# Patient Record
Sex: FEMALE | HISPANIC OR LATINO | RURAL
[De-identification: names, ages, dates, MRNs, and addresses within clinical notes are randomized per-mention and may not be internally consistent; named-entity substitution may affect disease eponyms.]

---

## 2017-11-14 ENCOUNTER — OFFICE VISIT (OUTPATIENT)
Dept: RETAIL CLINIC | Facility: CLINIC | Age: 32
End: 2017-11-14

## 2017-11-14 VITALS
HEIGHT: 62 IN | OXYGEN SATURATION: 98 % | TEMPERATURE: 98.4 F | DIASTOLIC BLOOD PRESSURE: 72 MMHG | WEIGHT: 115.5 LBS | RESPIRATION RATE: 14 BRPM | HEART RATE: 96 BPM | SYSTOLIC BLOOD PRESSURE: 106 MMHG | BODY MASS INDEX: 21.25 KG/M2

## 2017-11-14 DIAGNOSIS — N30.00 ACUTE CYSTITIS WITHOUT HEMATURIA: Primary | ICD-10-CM

## 2017-11-14 LAB
BILIRUB BLD-MCNC: NEGATIVE MG/DL
CLARITY, POC: CLEAR
COLOR UR: YELLOW
GLUCOSE UR STRIP-MCNC: NEGATIVE MG/DL
KETONES UR QL: NEGATIVE
LEUKOCYTE EST, POC: ABNORMAL
NITRITE UR-MCNC: NEGATIVE MG/ML
PH UR: 7 [PH] (ref 5–8)
PROT UR STRIP-MCNC: NEGATIVE MG/DL
RBC # UR STRIP: NEGATIVE /UL
SP GR UR: 1.01 (ref 1–1.03)
UROBILINOGEN UR QL: NORMAL

## 2017-11-14 PROCEDURE — 81003 URINALYSIS AUTO W/O SCOPE: CPT | Performed by: NURSE PRACTITIONER

## 2017-11-14 PROCEDURE — 99213 OFFICE O/P EST LOW 20 MIN: CPT | Performed by: NURSE PRACTITIONER

## 2017-11-14 RX ORDER — AMOXICILLIN AND CLAVULANATE POTASSIUM 500; 125 MG/1; MG/1
1 TABLET, FILM COATED ORAL 2 TIMES DAILY
Qty: 14 TABLET | Refills: 0 | Status: SHIPPED | OUTPATIENT
Start: 2017-11-14 | End: 2018-03-30

## 2017-11-14 NOTE — PATIENT INSTRUCTIONS
Infección de las vías urinarias en los adultos  (Urinary Tract Infection, Adult)  Caio infección urinaria (IU) es caio infección en cualquier parte de las vías urinarias, que incluyen los riñones, los uréteres, la vejiga y la uretra. Estos órganos fabrican, almacenan y eliminan la orina del organismo. La IU puede ser caio infección de la vejiga (cistitis) o infección de los riñones (pielonefritis).  CAUSAS  Esta infección puede ser causada por hongos, virus o bacterias. Las bacterias son las causas más comunes de las IU. Esta afección también puede ser provocada por no vaciar la vejiga por completo ji la micción en repetidas ocasiones.   FACTORES DE RIESGO  Es más probable que esta afección se manifieste si:   · Usted ignora la necesidad de orinar o retiene la orina ji largos períodos.  · No vacía la vejiga completamente ji la micción.  · Se limpia de atrás hacia adelante después de orinar o defecar, en el emely de que sea josh.  · Está circuncidado, en el emely de que sea varón.    · Tiene estreñimiento.  · Tiene colocada caio sonda urinaria permanente.  · Tiene debilitado el sistema de defensa (inmunitario) del cuerpo.  · Tiene caio enfermedad que afecta los intestinos, los riñones o la vejiga.  · Tiene diabetes.  · Chiqui antibióticos con frecuencia o ji largos períodos, y los antibióticos ya no resultan eficaces para combatir algunos tipos de infecciones (resistencia a los antibióticos).  · Chiqui medicamentos que le irritan las vías urinarias.  · Está expuesto a sustancias químicas que le irritan las vías urinarias.  · Es josh.  SÍNTOMAS   Los síntomas de esta afección incluyen lo siguiente:   · Fiebre.    · Micción frecuente o eliminación de pequeñas cantidades de orina con frecuencia.    · Necesidad urgente de orinar.    · Ardor o dolor al orinar.    · Orina con mal olor u olor atípico.    · Orina turbia.    · Dolor en la parte baja del abdomen o en la espalda.    · Dificultad para orinar.     · Mark en la orina.    · Vómitos o más apetito de lo normal.     · Diarrea o dolor abdominal.    · Secreción vaginal, si es josh.  DIAGNÓSTICO  Esta afección se diagnostica mediante maría antecedentes médicos y un examen físico. También deberá proporcionar caio muestra de orina para realizar análisis. Podrán indicarle otros estudios, por ejemplo:    · Análisis de mark.    · Pruebas de detección de enfermedades de transmisión sexual (ETS).     Si ha tenido más de caio IU, se pueden hacer estudios de diagnóstico por imágenes o caio citoscopia para determinar la causa de las infecciones.   TRATAMIENTO   El tratamiento de esta afección suele incluir caio combinación de dos o más de los siguientes:   · Antibióticos.    · Otros medicamentos para tratar las causas menos frecuentes de infección urinaria.    · Medicamentos de venta tamir para aliviar el dolor.    · Consumo de la cantidad necesaria de agua para mantenerse hidratado.  INSTRUCCIONES PARA EL CUIDADO EN EL HOGAR  · [place] los medicamentos de venta tamir y los recetados solamente betsy se lo haya indicado el médico.  · Si le recetaron un antibiótico, tómelo betsy se lo haya indicado el médico. No deje de anna los antibióticos aunque comience a sentirse mejor.  · Evite el alcohol, la cafeína, el té y las bebidas gaseosas. Estas sustancias pueden irritar la vejiga.  · Ana suficiente líquido para mantener la orina susie o de color amarillo pálido.  · Concurra a todas las visitas de control betsy se lo haya indicado el médico. [place] es importante.  · Asegúrese de lo siguiente:    Vaciar la vejiga con frecuencia y en huitron totalidad. No contener la orina ji largos períodos.    Vaciar la vejiga antes y después de tener relaciones sexuales.    Limpiar de adelante hacia atrás después de defecar, si es josh. Usar cada trozo de papel caio vez cuando se limpie.  SOLICITE ATENCIÓN MÉDICA SI:   · Siente dolor en la espalda.    · Tiene fiebre.  · Siente náuseas o  vomita.    · Los síntomas no mejoran después de 3 días de tratamiento.     · Los síntomas desaparecen y luego reaparecen.  SOLICITE ATENCIÓN MÉDICA DE INMEDIATO SI:   · Siente dolor intenso en la espalda o en la taj inferior del abdomen.    · Tiene vómitos y no puede tragar medicamentos ni agua.     Esta información no tiene betsy fin reemplazar el consejo del médico. Asegúrese de hacerle al médico cualquier pregunta que tenga.     Document Released: 09/27/2006 Document Revised: 04/10/2017  Elsevier Interactive Patient Education ©2017 [employer] Inc.

## 2017-11-14 NOTE — PROGRESS NOTES
"Subjective   Trisha Kearney is a 31 y.o. female.   /72 (BP Location: Left arm, Patient Position: Sitting, Cuff Size: Adult)  Pulse 96  Temp 98.4 °F (36.9 °C) (Temporal Artery )   Resp 14  Ht 62\" (157.5 cm)  Wt 115 lb 8 oz (52.4 kg)  LMP 11/04/2017  SpO2 98%  BMI 21.13 kg/m2      Urinary Tract Infection    This is a new problem. The current episode started in the past 7 days. The problem has been gradually worsening. The quality of the pain is described as burning. The pain is mild. Associated symptoms include frequency and urgency. Pertinent negatives include no discharge, hematuria, hesitancy, nausea, possible pregnancy, sweats or vomiting. Flank pain: left side.        The following portions of the patient's history were reviewed and updated as appropriate: allergies, current medications, past family history, past medical history, past social history, past surgical history and problem list.    Review of Systems   Gastrointestinal: Negative for nausea and vomiting.   Genitourinary: Positive for frequency and urgency. Negative for hematuria and hesitancy. Flank pain: left side.       Objective   Physical Exam   Constitutional: She appears well-developed and well-nourished.  Non-toxic appearance. She does not have a sickly appearance.   HENT:   Head: Normocephalic and atraumatic.   Right Ear: Tympanic membrane and ear canal normal.   Left Ear: Tympanic membrane and ear canal normal.   Nose: Mucosal edema and rhinorrhea present. Right sinus exhibits no maxillary sinus tenderness and no frontal sinus tenderness. Left sinus exhibits no maxillary sinus tenderness and no frontal sinus tenderness.   Cardiovascular: Regular rhythm and normal heart sounds.    Pulmonary/Chest: Effort normal. She has no wheezes. She has no rhonchi. She has no rales.   Abdominal: Soft. Bowel sounds are normal. There is tenderness in the periumbilical area. There is no rigidity, no rebound, no guarding and no CVA tenderness. "   Lymphadenopathy:     She has no cervical adenopathy.   Skin: Skin is warm and dry.       Assessment/Plan   Trisha was seen today for urinary tract infection.    Diagnoses and all orders for this visit:    Acute cystitis without hematuria  -     POC Urinalysis Dipstick, Automated    Other orders  -     amoxicillin-clavulanate (AUGMENTIN) 500-125 MG per tablet; Take 1 tablet by mouth 2 (Two) Times a Day.      Results for orders placed or performed in visit on 11/14/17   POC Urinalysis Dipstick, Automated   Result Value Ref Range    Color Yellow Yellow, Straw, Dark Yellow, Stephanie    Clarity, UA Clear Clear    Glucose, UA Negative Negative, 1000 mg/dL (3+) mg/dL    Bilirubin Negative Negative    Ketones, UA Negative Negative    Specific Gravity  1.010 1.005 - 1.030    Blood, UA Negative Negative    pH, Urine 7.0 5.0 - 8.0    Protein, POC Negative Negative mg/dL    Urobilinogen, UA Normal Normal    Leukocytes Trace (A) Negative    Nitrite, UA Negative Negative

## 2017-12-11 ENCOUNTER — OFFICE VISIT (OUTPATIENT)
Dept: RETAIL CLINIC | Facility: CLINIC | Age: 32
End: 2017-12-11

## 2017-12-11 VITALS — HEIGHT: 59 IN | WEIGHT: 119 LBS | BODY MASS INDEX: 23.99 KG/M2

## 2017-12-11 DIAGNOSIS — H61.23 BILATERAL IMPACTED CERUMEN: Primary | ICD-10-CM

## 2017-12-11 PROCEDURE — 99213 OFFICE O/P EST LOW 20 MIN: CPT | Performed by: NURSE PRACTITIONER

## 2017-12-11 NOTE — PATIENT INSTRUCTIONS
Earwax Buildup  Your ears make a substance called earwax. It may also be called cerumen. Sometimes, too much earwax builds up in your ear canal. This can cause ear pain and make it harder for you to hear.  CAUSES  This condition is caused by too much earwax production or buildup.  RISK FACTORS  The following factors may make you more likely to develop this condition:  · Cleaning your ears often with swabs.  · Having narrow ear canals.  · Having earwax that is overly thick or sticky.  · Having eczema.  · Being dehydrated.  SYMPTOMS  Symptoms of this condition include:  · Reduced hearing.  · Ear drainage.  · Ear pain.  · Ear itch.  · A feeling of fullness in the ear or feeling that the ear is plugged.  · Ringing in the ear.  · Coughing.  DIAGNOSIS  Your health care provider can diagnose this condition based on your symptoms and medical history. Your health care provider will also do an ear exam to look inside your ear with a scope (otoscope). You may also have a hearing test.  TREATMENT  Treatment for this condition includes:  · Over-the-counter or prescription ear drops to soften the earwax.  · Earwax removal by a health care provider. This may be done:    By flushing the ear with body-temperature water.    With a medical instrument that has a loop at the end (earwax curette).    With a suction device.  HOME CARE INSTRUCTIONS  · Take over-the-counter and prescription medicines only as told by your health care provider.  · Do not put any objects, including an ear swab, into your ear. You can clean the opening of your ear canal with a washcloth.  · Drink enough water to keep your urine clear or pale yellow.  · If you have frequent earwax buildup or you use hearing aids, consider seeing your health care provider every 6-12 months for routine preventive ear cleanings. Keep all follow-up visits as told by your health care provider.  SEEK MEDICAL CARE IF:  · You have ear pain.  · Your condition does not improve with  treatment.  · You have hearing loss.  · You have blood, pus, or other fluid coming from your ear.     This information is not intended to replace advice given to you by your health care provider. Make sure you discuss any questions you have with your health care provider.     Document Released: 01/25/2006 Document Revised: 04/10/2017 Document Reviewed: 08/03/2016  [employer] Interactive Patient Education ©2017 Elsevier Inc.

## 2017-12-11 NOTE — PROGRESS NOTES
"Subjective   Trisha Kearney is a 32 y.o. female.     Ear Fullness    There is pain in both ears. This is a chronic problem. The current episode started more than 1 month ago. The problem occurs constantly. The problem has been unchanged. There has been no fever. The patient is experiencing no pain. Associated symptoms comments: Wax build up. She has tried ear drops for the symptoms. The treatment provided no relief. There is no history of a chronic ear infection, hearing loss or a tympanostomy tube.        The following portions of the patient's history were reviewed and updated as appropriate: allergies, current medications, past medical history, past social history, past surgical history and problem list.    Review of Systems   Constitutional: Negative.  Negative for fever.   HENT: Negative.         Ear fullness, was build up bilat   Respiratory: Negative.    Hematological: Negative.  Negative for adenopathy.        Ht 148.6 cm (58.5\")  Wt 54 kg (119 lb)  LMP 12/06/2017  BMI 24.45 kg/m2     Objective   Physical Exam   Constitutional: She is oriented to person, place, and time. She appears well-developed and well-nourished. No distress.   HENT:   Head: Normocephalic.   Right Ear: Hearing and external ear normal. No drainage, swelling or tenderness. A foreign body (cerumen impaction) is present. Right ear bulging TM: unable to view TM.   Left Ear: Hearing and external ear normal. No drainage, swelling or tenderness. A foreign body (cerumen impaction) is present. Left ear bulging TM: unable to view TM.   Neurological: She is alert and oriented to person, place, and time.   Skin: Skin is warm and dry.   Psychiatric: She has a normal mood and affect. Her behavior is normal. Thought content normal.   Vitals reviewed.      Assessment/Plan   Diagnoses and all orders for this visit:    Bilateral impacted cerumen    Treated with O2H2 and water.  Unable to clear impaction.  Patient tolerated procedure well.           "

## 2018-03-30 ENCOUNTER — OFFICE VISIT (OUTPATIENT)
Dept: RETAIL CLINIC | Facility: CLINIC | Age: 33
End: 2018-03-30

## 2018-03-30 VITALS
OXYGEN SATURATION: 98 % | HEIGHT: 59 IN | TEMPERATURE: 98.1 F | RESPIRATION RATE: 24 BRPM | HEART RATE: 88 BPM | BODY MASS INDEX: 23.71 KG/M2 | WEIGHT: 117.6 LBS

## 2018-03-30 DIAGNOSIS — N30.01 ACUTE CYSTITIS WITH HEMATURIA: Primary | ICD-10-CM

## 2018-03-30 LAB
BILIRUB BLD-MCNC: NEGATIVE MG/DL
CLARITY, POC: ABNORMAL
COLOR UR: YELLOW
GLUCOSE UR STRIP-MCNC: NEGATIVE MG/DL
KETONES UR QL: NEGATIVE
LEUKOCYTE EST, POC: ABNORMAL
NITRITE UR-MCNC: POSITIVE MG/ML
PH UR: 5.5 [PH] (ref 5–8)
PROT UR STRIP-MCNC: ABNORMAL MG/DL
RBC # UR STRIP: ABNORMAL /UL
SP GR UR: 1.01 (ref 1–1.03)
UROBILINOGEN UR QL: NORMAL

## 2018-03-30 PROCEDURE — 99213 OFFICE O/P EST LOW 20 MIN: CPT | Performed by: NURSE PRACTITIONER

## 2018-03-30 PROCEDURE — 81002 URINALYSIS NONAUTO W/O SCOPE: CPT | Performed by: NURSE PRACTITIONER

## 2018-03-30 RX ORDER — NITROFURANTOIN 25; 75 MG/1; MG/1
100 CAPSULE ORAL EVERY 12 HOURS SCHEDULED
Qty: 14 CAPSULE | Refills: 0 | OUTPATIENT
Start: 2018-03-30 | End: 2018-04-06

## 2018-03-30 RX ORDER — PHENAZOPYRIDINE HYDROCHLORIDE 200 MG/1
200 TABLET, FILM COATED ORAL 3 TIMES DAILY PRN
Qty: 6 TABLET | Refills: 0 | OUTPATIENT
Start: 2018-03-30 | End: 2018-04-01

## 2018-03-30 NOTE — PROGRESS NOTES
Subjective   Trisha Kearney is a 32 y.o. female.   Chief Complaint   Patient presents with   • Urinary Frequency      33 yo female, accompanied by  and children, ( ,  pt not fluent in english), presents with complaint of burning with urination, urinary frequency, and back pain duration of 3 days.  She has taken no medication.  See HPI/ROS for additonal information.      Urinary Frequency    This is a new problem. The current episode started in the past 7 days (3 days ago). The problem occurs every urination. The problem has been gradually worsening. The quality of the pain is described as burning. The pain is at a severity of 7/10. There has been no fever. She is sexually active. There is no history of pyelonephritis. Associated symptoms include flank pain, frequency, hematuria and urgency. Pertinent negatives include no chills. She has tried nothing for the symptoms. The treatment provided no relief.        The following portions of the patient's history were reviewed and updated as appropriate: allergies, current medications, past family history, past medical history, past social history, past surgical history and problem list.    Current Outpatient Prescriptions:   •  nitrofurantoin, macrocrystal-monohydrate, (MACROBID) 100 MG capsule, Take 1 capsule by mouth Every 12 (Twelve) Hours for 7 days., Disp: 14 capsule, Rfl: 0  •  phenazopyridine (PYRIDIUM) 200 MG tablet, Take 1 tablet by mouth 3 (Three) Times a Day As Needed for bladder spasms for up to 2 days., Disp: 6 tablet, Rfl: 0    Review of Systems   Constitutional: Positive for fatigue. Negative for activity change, appetite change, chills and fever.   HENT: Negative.    Eyes: Negative.    Respiratory: Negative.    Cardiovascular: Negative.    Gastrointestinal: Negative.    Genitourinary: Positive for flank pain, frequency, hematuria and urgency. Negative for difficulty urinating, menstrual problem and pelvic pain.   Skin: Negative  "for rash.   All other systems reviewed and are negative.    Pulse 88   Temp 98.1 °F (36.7 °C) (Oral)   Resp 24   Ht 149.9 cm (59\")   Wt 53.3 kg (117 lb 9.6 oz)   LMP 03/05/2018 (Exact Date)   SpO2 98%   BMI 23.75 kg/m²     Objective   No Known Allergies    Physical Exam   Constitutional: She is oriented to person, place, and time. She appears well-developed and well-nourished. No distress.   HENT:   Head: Normocephalic.   Right Ear: External ear normal.   Left Ear: External ear normal.   Nose: Nose normal.   Mouth/Throat: Oropharynx is clear and moist. No oropharyngeal exudate.   Cardiovascular: Normal rate, regular rhythm and normal heart sounds.    Pulmonary/Chest: Effort normal and breath sounds normal. No respiratory distress.   Abdominal: Soft. Bowel sounds are normal. She exhibits no distension and no mass. There is tenderness in the suprapubic area. There is CVA tenderness. There is no rebound and no guarding. No hernia.   Neurological: She is alert and oriented to person, place, and time.   Skin: Skin is warm. Capillary refill takes less than 2 seconds. She is not diaphoretic.   Psychiatric: She has a normal mood and affect. Her behavior is normal.   Vitals reviewed.      Assessment/Plan   Trisha was seen today for urinary frequency.    Diagnoses and all orders for this visit:    Acute cystitis with hematuria  -     POC Urinalysis Dipstick    Other orders  -     nitrofurantoin, macrocrystal-monohydrate, (MACROBID) 100 MG capsule; Take 1 capsule by mouth Every 12 (Twelve) Hours for 7 days.  -     phenazopyridine (PYRIDIUM) 200 MG tablet; Take 1 tablet by mouth 3 (Three) Times a Day As Needed for bladder spasms for up to 2 days.        Results for orders placed or performed in visit on 03/30/18   POC Urinalysis Dipstick   Result Value Ref Range    Color Yellow Yellow, Straw, Dark Yellow, Stephanie    Clarity, UA Slightly Cloudy (A) Clear    Glucose, UA Negative Negative, 1000 mg/dL (3+) mg/dL    Bilirubin " Negative Negative    Ketones, UA Negative Negative    Specific Gravity  1.015 1.005 - 1.030    Blood, UA Trace (A) Negative    pH, Urine 5.5 5.0 - 8.0    Protein, POC Trace (A) Negative mg/dL    Urobilinogen, UA Normal Normal    Leukocytes Small (1+) (A) Negative    Nitrite, UA Positive (A) Negative        Discussed symptoms/urinary results. Pt/ decline urine sent for C&[facility] as they are self pay.  Discussed s/s of kidney infection.  Understanding verbalized and agree with plan of care.       An After Visit Summary was printed, reviewed, and given to the patient. Understanding verbalized and agrees with treatment plan.  If no improvement or becomes worse, follow up with primary or go to [facility].     March 30, 2018 9:48 AM

## 2018-03-30 NOTE — PATIENT INSTRUCTIONS
Polaquiuria en los adultos  (Urinary Frequency, Adult)  El término polaquiuria describe la necesidad de orinar con mayor frecuencia que lo normal. Las personas que padecen polaquiuria orinan, betsy mínimo, 8 veces en 24 horas, incluso cuando beben caio cantidad normal de líquido. Si delmer orinan con mayor frecuencia que lo habitual, la cantidad de orina total producida en un día podría ser normal. La polaquiuria también se conoce betsy micción frecuente.  CAUSAS  Esta afección puede ser causada por lo siguiente:  · Infección de las vías urinarias.  · Obesidad.  · Problemas de vejiga, betsy cálculos en la vejiga.  · Cafeína y alcohol.  · [place] alimentos y beber líquidos que irritan la vejiga. Por ejemplo, café, té, gaseosas, endulzantes artificiales, cítricos, alimentos preparados a base de tomate y chocolate.  · Determinados medicamentos, betsy los medicamentos que ayudan a que el cuerpo elimine el líquido extra (diuréticos).  · Debilidad muscular o nerviosa.  · Vejiga hiperactiva.  · Diabetes crónica.  · Cistitis intersticial.  · En los hombres, problemas en la próstata, betsy agrandamiento de la próstata.  · En las mujeres, el embarazo.  En algunos casos, es posible que la causa no se conozca.  FACTORES DE RIESGO  Es más probable que esta afección se manifieste en:  · Las mujeres que están cursando la menopausia.  · Los hombres con problemas de próstata.  · Las personas que tienen caio enfermedad o lesión que afecta los nervios de la médula omallye.  · Las personas que tienen o villalba tenido caio afección que afecta el cerebro, betsy ictus.  SÍNTOMAS  Los síntomas de esta afección incluyen lo siguiente:  · Sentir con frecuencia caio necesidad urgente de orinar. El estrés y la ansiedad por la necesidad de encontrar un baño rápidamente pueden hacer que esta urgencia empeore.  · Orinar 8 o más veces en 24 horas.  · Orinar cada 1 o 2 horas.  DIAGNÓSTICO  Esta afección se diagnostica en función de los síntomas, los antecedentes  médicos y un examen físico. Pueden hacerle estudios, por ejemplo:  · Análisis de mark.  · Análisis de orina.  · Estudios de diagnóstico por imágenes, betsy radiografías o ecografías.  · Estudio de vejiga.  · Estudio del sistema neurológico. [place] es el sistema corporal que detecta la necesidad de orinar.  · [place] prueba para detectar problemas en la uretra y la vejiga llamada cistoscopia.  También se le podría pedir que lleve un diario de micciones. Un diario de micciones es un registro de lo que come y lito, la frecuencia con la que orina y la cantidad. Es posible que tenga que visitar a un médico que se especialice en afecciones de las vías urinarias (urólogo) o de los riñones (nefrólogo).  TRATAMIENTO  El tratamiento de esta afección depende de la causa. A veces, la afección desaparece por sí bayron y el tratamiento no es necesario. En emely de ser necesario, el tratamiento podría consistir en lo siguiente:  · Vito medicamentos.  · Ejercicios de aprendizaje que fortalecen los músculos que ayudan a controlar la micción.  · Seguir un programa de educación del esfínter vesical. [place] puede incluir lo siguiente:  ¨ Aprender a retrasar las ganas de ir al baño.  ¨ Orinar dos veces seguidas (vaciamiento de la vejiga). Esta técnica es útil cuando no vacía la vejiga de forma completa.  ¨ Micción programada.  · Realizar cambios en la dieta, tales betsy:  ¨ Evitar la cafeína.  ¨ Beber jennifer cantidad de líquidos, especialmente alcohol.  ¨ No beber por la noche.  ¨ No comer alimentos ni beber líquidos que puedan irritar la vejiga.  ¨ [place] alimentos que ayudan a prevenir o aliviar el estreñimiento. El estreñimiento puede empeorar esta afección.  · Tener los nervios de la vejiga estimulados. Hay dos opciones para estimular los nervios de la vejiga:  ¨ Estimulación eléctrica de los nervios de forma ambulatoria. [place] lo realiza el médico.  ¨ [place] para colocar un marcapasos para la vejiga. El marcapasos ayuda a controlar la  necesidad imperiosa de orinar.  INSTRUCCIONES PARA EL CUIDADO EN EL HOGAR  · Lleve un diario de micciones si se lo indicó el médico.  · [place] los medicamentos de venta tamir y los recetados solamente betsy se lo haya indicado el médico.  · [place] ejercicios betsy se lo haya indicado el médico.  · Siga un programa de educación del esfínter vesical demi betsy se lo haya indicado el médico.  · Realice los cambios en la dieta que jeannie necesarios.  · Concurra a todas las visitas de control betsy se lo haya indicado el médico. [place] es importante.  SOLICITE ATENCIÓN MÉDICA SI:  · Comienza a orinar con mayor frecuencia.  · Siente dolor o irritación al orinar.  · Observa mark en la orina.  · La orina se ve turbia.  · Tiene fiebre.  · Comienza a vomitar.  SOLICITE ATENCIÓN MÉDICA DE INMEDIATO SI:  · No puede orinar.  Esta información no tiene betsy fin reemplazar el consejo del médico. Asegúrese de hacerle al médico cualquier pregunta que tenga.  Document Released: 09/11/2013 Document Revised: 07/13/2016 Document Reviewed: 07/13/2016  [place] Interactive Patient Education © 2017 [employer] Inc.

## 2018-09-13 ENCOUNTER — OFFICE VISIT (OUTPATIENT)
Dept: RETAIL CLINIC | Facility: CLINIC | Age: 33
End: 2018-09-13

## 2018-09-13 VITALS
WEIGHT: 120 LBS | SYSTOLIC BLOOD PRESSURE: 122 MMHG | BODY MASS INDEX: 24.19 KG/M2 | TEMPERATURE: 97.9 F | HEIGHT: 59 IN | HEART RATE: 81 BPM | DIASTOLIC BLOOD PRESSURE: 70 MMHG | RESPIRATION RATE: 20 BRPM | OXYGEN SATURATION: 99 %

## 2018-09-13 DIAGNOSIS — H61.23 BILATERAL IMPACTED CERUMEN: ICD-10-CM

## 2018-09-13 DIAGNOSIS — N30.00 ACUTE CYSTITIS WITHOUT HEMATURIA: ICD-10-CM

## 2018-09-13 DIAGNOSIS — R30.0 DYSURIA: Primary | ICD-10-CM

## 2018-09-13 LAB
BILIRUB BLD-MCNC: NEGATIVE MG/DL
CLARITY, POC: ABNORMAL
COLOR UR: YELLOW
GLUCOSE UR STRIP-MCNC: NEGATIVE MG/DL
KETONES UR QL: NEGATIVE
LEUKOCYTE EST, POC: ABNORMAL
NITRITE UR-MCNC: NEGATIVE MG/ML
PH UR: 6 [PH] (ref 5–8)
PROT UR STRIP-MCNC: NEGATIVE MG/DL
RBC # UR STRIP: NEGATIVE /UL
SP GR UR: 1.02 (ref 1–1.03)
UROBILINOGEN UR QL: NORMAL

## 2018-09-13 PROCEDURE — 81003 URINALYSIS AUTO W/O SCOPE: CPT | Performed by: NURSE PRACTITIONER

## 2018-09-13 PROCEDURE — 99213 OFFICE O/P EST LOW 20 MIN: CPT | Performed by: NURSE PRACTITIONER

## 2018-09-13 RX ORDER — SULFAMETHOXAZOLE AND TRIMETHOPRIM 800; 160 MG/1; MG/1
1 TABLET ORAL 2 TIMES DAILY
Qty: 14 TABLET | Refills: 0 | Status: SHIPPED | OUTPATIENT
Start: 2018-09-13 | End: 2018-09-20

## 2018-09-13 RX ORDER — PHENAZOPYRIDINE HYDROCHLORIDE 100 MG/1
100 TABLET, FILM COATED ORAL 3 TIMES DAILY PRN
Qty: 6 TABLET | Refills: 0 | Status: SHIPPED | OUTPATIENT
Start: 2018-09-13 | End: 2018-09-15

## 2018-09-13 NOTE — PROGRESS NOTES
RUTH@  Trisha Kearney is a 32 y.o. female presents with spouse for urinary frequency and burning.  States that symptoms feel the same as last UTI.  Spouse states that previous medication (macrobid) did not help and she had to go get a different medication.  Denies any treatment for current symptoms.  Patient speaks limited English and spouse is interpreting.  Declined interpretor for visit.  Spouse also request to look at ears, states has problem with ear wax and has been using home irrigation system, wants to know if there is something else to try. Denies ear pain    Chief Complaint   Patient presents with   • Urinary Tract Infection      Urinary Tract Infection    This is a new problem. The current episode started in the past 7 days. The problem has been gradually worsening. The quality of the pain is described as burning and aching. The pain is at a severity of 7/10. The pain is moderate. There has been no fever. She is sexually active. There is no history of pyelonephritis. Associated symptoms include frequency and urgency. Pertinent negatives include no discharge, flank pain, hematuria, nausea, possible pregnancy or vomiting. She has tried nothing for the symptoms.        The following portions of the patient's history were reviewed and updated as appropriate: allergies, current medications, past family history, past medical history, past social history, past surgical history and problem list.  No current outpatient prescriptions on file.    No Known Allergies    Review of Systems   Constitutional: Negative for fever and unexpected weight change.   HENT: Negative for congestion, ear discharge, ear pain (states has ear wax in both ears), postnasal drip, rhinorrhea, sinus pain, sinus pressure, trouble swallowing and voice change.    Eyes: Negative.    Respiratory: Negative.    Cardiovascular: Negative.    Gastrointestinal: Negative.  Negative for nausea and vomiting.   Genitourinary: Positive for  dysuria, frequency, pelvic pain and urgency. Negative for flank pain, hematuria, vaginal bleeding, vaginal discharge and vaginal pain.   Musculoskeletal: Negative.    Skin: Negative.    Neurological: Negative.        Objective   Vitals:    09/13/18 0915   BP: 122/70   Pulse: 81   Resp: 20   Temp: 97.9 °F (36.6 °C)   SpO2: 99%         Physical Exam   Constitutional: She is oriented to person, place, and time. She appears well-developed and well-nourished. No distress.   HENT:   Head: Normocephalic and atraumatic.   Right Ear: A foreign body (bilateral cerumen impaction, unable to see TMs ) is present.   Left Ear: A foreign body is present.   Eyes: Pupils are equal, round, and reactive to light. Conjunctivae and lids are normal.   Neck: Normal range of motion.   Cardiovascular: Normal rate, regular rhythm and normal heart sounds.    No murmur heard.  Pulmonary/Chest: Effort normal and breath sounds normal. No respiratory distress. She has no wheezes.   Abdominal: Soft. Bowel sounds are normal. She exhibits no distension and no mass. There is tenderness in the suprapubic area. There is no guarding and no CVA tenderness.   Lymphadenopathy:     She has no cervical adenopathy.   Neurological: She is alert and oriented to person, place, and time.   Skin: Skin is warm and dry. No rash noted.   Psychiatric: She has a normal mood and affect. Her speech is normal and behavior is normal.   Patient speaks limited English, spouse translating for her.  Declined interpretor.       Lab Results (last 24 hours)     Procedure Component Value Units Date/Time    POC Urinalysis Dipstick, Automated [[id number]]  (Abnormal) Collected:  09/13/18 0908    Specimen:  Urine Updated:  09/13/18 0910     Color Yellow     Clarity, UA Cloudy (A)     Specific Gravity  1.025     pH, Urine 6.0     Leukocytes Small (1+) (A)     Nitrite, UA Negative     Protein, POC Negative mg/dL      Glucose, UA Negative mg/dL      Ketones, UA Negative     Urobilinogen,  UA Normal     Bilirubin Negative     Blood, UA Negative          Assessment/Plan     Trisha was seen today for urinary tract infection.    Diagnoses and all orders for this visit:    Bilateral impacted cerumen- Discussed use of Debrox ear drops over the counter and ear wax removal options with spouse-states will try ear drops and follow up if needed.    Dysuria  -     POC Urinalysis Dipstick, Automated    Acute cystitis without hematuria  -     sulfamethoxazole-trimethoprim (BACTRIM DS) 800-160 MG per tablet; Take 1 tablet by mouth 2 (Two) Times a Day for 7 days.  -     phenazopyridine (PYRIDIUM) 100 MG tablet; Take 1 tablet by mouth 3 (Three) Times a Day As Needed for bladder spasms for up to 2 days.      Urine results discussed with spouse and patient.  Spouse declined culture.    Medications and plan of care reviewed with patient and spouse and teaching done on med reactions/side effects(printed in English and Kiswahili).  Rest, plenty of fluids, comfort measures and follow up with PCP if symptoms persist.  If worse go to urgent care or ER as discussed.  Spouse and patient verbalized understanding.    HAVEN Martines

## 2018-09-13 NOTE — PATIENT INSTRUCTIONS
Urinary Tract Infection, Adult  A urinary tract infection (UTI) is an infection of any part of the urinary tract, which includes the kidneys, ureters, bladder, and urethra. These organs make, store, and get rid of urine in the body. UTI can be a bladder infection (cystitis) or kidney infection (pyelonephritis).  What are the causes?  This infection may be caused by fungi, viruses, or bacteria. Bacteria are the most common cause of UTIs. This condition can also be caused by repeated incomplete emptying of the bladder during urination.  What increases the risk?  This condition is more likely to develop if:  · You ignore your need to urinate or hold urine for long periods of time.  · You do not empty your bladder completely during urination.  · You wipe back to front after urinating or having a bowel movement, if you are female.  · You are uncircumcised, if you are male.  · You are constipated.  · You have a urinary catheter that stays in place (indwelling).  · You have a weak defense (immune) system.  · You have a medical condition that affects your bowels, kidneys, or bladder.  · You have diabetes.  · You take antibiotic medicines frequently or for long periods of time, and the antibiotics no longer work well against certain types of infections (antibiotic resistance).  · You take medicines that irritate your urinary tract.  · You are exposed to chemicals that irritate your urinary tract.  · You are female.    What are the signs or symptoms?  Symptoms of this condition include:  · Fever.  · Frequent urination or passing small amounts of urine frequently.  · Needing to urinate urgently.  · Pain or burning with urination.  · Urine that smells bad or unusual.  · Cloudy urine.  · Pain in the lower abdomen or back.  · Trouble urinating.  · Blood in the urine.  · Vomiting or being less hungry than normal.  · Diarrhea or abdominal pain.  · Vaginal discharge, if you are female.    How is this diagnosed?  This condition is  diagnosed with a medical history and physical exam. You will also need to provide a urine sample to test your urine. Other tests may be done, including:  · Blood tests.  · Sexually transmitted disease (STD) testing.    If you have had more than one UTI, a cystoscopy or imaging studies may be done to determine the cause of the infections.  How is this treated?  Treatment for this condition often includes a combination of two or more of the following:  · Antibiotic medicine.  · Other medicines to treat less common causes of UTI.  · Over-the-counter medicines to treat pain.  · Drinking enough water to stay hydrated.    Follow these instructions at home:  · Take over-the-counter and prescription medicines only as told by your health care provider.  · If you were prescribed an antibiotic, take it as told by your health care provider. Do not stop taking the antibiotic even if you start to feel better.  · Avoid alcohol, caffeine, tea, and carbonated beverages. They can irritate your bladder.  · Drink enough fluid to keep your urine clear or pale yellow.  · Keep all follow-up visits as told by your health care provider. This is important.  · Make sure to:  ? Empty your bladder often and completely. Do not hold urine for long periods of time.  ? Empty your bladder before and after sex.  ? Wipe from front to back after a bowel movement if you are female. Use each tissue one time when you wipe.  Contact a health care provider if:  · You have back pain.  · You have a fever.  · You feel nauseous or vomit.  · Your symptoms do not get better after 3 days.  · Your symptoms go away and then return.  Get help right away if:  · You have severe back pain or lower abdominal pain.  · You are vomiting and cannot keep down any medicines or water.  This information is not intended to replace advice given to you by your health care provider. Make sure you discuss any questions you have with your health care provider.  Document Released:  09/27/2006 Document Revised: 05/31/2017 Document Reviewed: 11/07/2016  [employer] Interactive Patient Education © 2018 [employer]  Infección de las vías urinarias, en adultos  Urinary Tract Infection, Adult  Caio infección de las vías urinarias (IVU) es caio infección en cualquier parte de las vías urinarias, que incluyen los riñones, los uréteres, la vejiga y la uretra. Estos órganos fabrican, almacenan y eliminan la orina del organismo. La IVU puede ser caio infección de la vejiga (cistitis) o caio infección renal (pielonefritis).  ¿Cuáles son las causas?  Esta infección puede deberse a hongos, virus o bacterias. Las bacterias son la causa más comunes de las IVU. Esta afección también puede ser provocada por no vaciar la vejiga por completo ji la micción en repetidas ocasiones.  ¿Qué incrementa el riesgo?  Es más probable que esta afección se manifieste si:  · Usted ignora la necesidad de orinar o retiene la orina ji mucho tiempo.  · No vacía la vejiga completamente ji la micción.  · Es caio josh y se limpia de atrás hacia adelante después de orinar o defecar.  · Es un hombre y está circuncidado.  · Tiene estreñimiento.  · Tiene colocado un catéter urinario (sonda urinaria) permanente.  · Tiene debilitado el sistema de defensa (inmunitario) del cuerpo.  · Tiene caio enfermedad que afecta los intestinos, los riñones o la vejiga.  · Tiene diabetes.  · [place] antibióticos con frecuencia o ji largos períodos, y los antibióticos ya no resultan eficaces para combatir algunos tipos de infecciones (resistencia a los antibióticos).  · [place]iqui medicamentos que le irritan las vías urinarias.  · Está expuesto a sustancias químicas que le irritan las vías urinarias.  · Es josh.    ¿Cuáles son los signos o los síntomas?  Los síntomas de esta afección incluyen lo siguiente:  · Fiebre.  · Micción frecuente o eliminación de pequeñas cantidades de orina con frecuencia.  · Necesidad urgente de orinar.  · Ardor o dolor al  orinar.  · Orina con mal olor u olor atípico.  · Orina turbia.  · Dolor en la parte baja del abdomen o en la espalda.  · Dificultad para orinar.  · Presencia de mark en la orina.  · Tener vómitos o menos apetito de lo normal.  · Diarrea o dolor abdominal.  · Secreción vaginal, si es josh.    ¿Cómo se diagnostica?  Esta afección se diagnostica con base en la historia clínica y un examen físico. También deberá proporcionar caio muestra de orina para realizar análisis. Podrán indicarle otros estudios, por ejemplo:  · Análisis de mark.  · Análisis de enfermedades de transmisión sexual (ETS).    Si ha tenido más de caio IVU, se pueden hacer estudios de diagnóstico por imágenes o caio cistoscopia para determinar la causa de las infecciones.  ¿Cómo se trata?  El tratamiento de esta afección suele incluir caio combinación de dos o más de los siguientes:  · Antibióticos.  · Otros medicamentos para tratar causas menos frecuentes de IVU.  · Medicamentos de venta tamir para aliviar el dolor.  · Cantidad suficiente agua para mantenerse hidratado.    Siga estas indicaciones en huitron casa:  · [place] los medicamentos de venta tamir y los recetados solamente betsy se lo haya indicado el médico.  · Si le recetaron un antibiótico, tómelo betsy se lo haya indicado el médico. No deje de anna el antibiótico aunque comience a sentirse mejor.  · Evite el alcohol, la cafeína, el té y las bebidas gaseosas. Estas bebidas pueden irritar la vejiga.  · Ana suficiente líquido para mantener la orina susie o de color amarillo pálido.  · Concurra a todas las visitas de seguimiento betsy se lo haya indicado el médico. [place] es importante.  · Asegúrese de lo siguiente:  ? Vaciar la vejiga con frecuencia y en huitron totalidad. No retener la orina ji largos períodos.  ? Vaciar la vejiga antes y después de tener relaciones sexuales.  ? Limpiarse de adelante hacia atrás después de defecar, si es josh. Usar cada trozo de papel higiénico solo caio vez cuando se  limpie.  Comuníquese con un médico si:  · Siente dolor en la espalda.  · Tiene fiebre.  · Siente náuseas o vomita.  · Los síntomas no mejoran después de 3 días de tratamiento.  · Los síntomas desaparecen y luego vuelven a aparecer.  Solicite ayuda de inmediato si:  · Siente dolor intenso en la espalda o en la taj inferior del abdomen.  · Tiene vómitos y no puede tragar los medicamentos ni anna agua.  Esta información no tiene betsy fin reemplazar el consejo del médico. Asegúrese de hacerle al médico cualquier pregunta que tenga.  Document Released: 09/27/2006 Document Revised: 04/04/2018 Document Reviewed: 11/07/2016  [employer] Interactive Patient Education © 2018 [employer]  Dysuria  Dysuria is pain or discomfort while urinating. The pain or discomfort may be felt in the tube that carries urine out of the bladder (urethra) or in the surrounding tissue of the genitals. The pain may also be felt in the groin area, lower abdomen, and lower back. You may have to urinate frequently or have the sudden feeling that you have to urinate (urgency). Dysuria can affect both men and women, but is more common in women.  Dysuria can be caused by many different things, including:  · Urinary tract infection in women.  · Infection of the kidney or bladder.  · Kidney stones or bladder stones.  · Certain sexually transmitted infections (STIs), such as chlamydia.  · Dehydration.  · Inflammation of the vagina.  · Use of certain medicines.  · Use of certain soaps or scented products that cause irritation.    Follow these instructions at home:  Watch your dysuria for any changes. The following actions may help to reduce any discomfort you are feeling:  · Drink enough fluid to keep your urine clear or pale yellow.  · Empty your bladder often. Avoid holding urine for long periods of time.  · After a bowel movement or urination, women should cleanse from front to back, using each tissue only once.  · Empty your bladder after sexual  intercourse.  · Take medicines only as directed by your health care provider.  · If you were prescribed an antibiotic medicine, finish it all even if you start to feel better.  · Avoid caffeine, tea, and alcohol. They can irritate the bladder and make dysuria worse. In men, alcohol may irritate the prostate.  · Keep all follow-up visits as directed by your health care provider. This is important.  · If you had any tests done to find the cause of dysuria, it is your responsibility to obtain your test results. Ask the lab or department performing the test when and how you will get your results. Talk with your health care provider if you have any questions about your results.    Contact a health care provider if:  · You develop pain in your back or sides.  · You have a fever.  · You have nausea or vomiting.  · You have blood in your urine.  · You are not urinating as often as you usually do.  Get help right away if:  · You pain is severe and not relieved with medicines.  · You are unable to hold down any fluids.  · You or someone else notices a change in your mental function.  · You have a rapid heartbeat at rest.  · You have shaking or chills.  · You feel extremely weak.  This information is not intended to replace advice given to you by your health care provider. Make sure you discuss any questions you have with your health care provider.  Document Released: 09/15/2005 Document Revised: 05/25/2017 Document Reviewed: 08/13/2015  [employer] Interactive Patient Education © 2018 [employer]  Disuria  (Dysuria)  La disuria es dolor o molestia al orinar. El dolor o la molestia se pueden sentir en el conducto que transporta la orina fuera de la vejiga (uretra) o en el tejido que rodea los genitales. El dolor también se puede sentir en la taj de la sal y en la parte inferior del abdomen y de la espalda. Quizás tenga que orinar con frecuencia o la sensación repentina de tener que orinar (tenesmo vesical). La disuria puede  afectar tanto a hombres betsy a mujeres, cristo es más común en las mujeres.  La causa puede deberse a muchos problemas diferentes:  · Infección en las vías urinarias en mujeres.  · Infección en los riñones o la vejiga.  · Cálculos en los riñones o la vejiga.  · Ciertas enfermedades de transmisión sexual (ETS), betsy la clamidia.  · Deshidratación.  · Inflamación de la vagina.  · Uso de ciertos medicamentos.  · Uso de ciertos jabones o productos perfumados que provocan irritación.  INSTRUCCIONES PARA EL CUIDADO EN EL HOGAR  Controle huitron disuria para carmenza si hay cambios. Las siguientes indicaciones pueden ayudar a aliviar cualquier molestia que pueda sentir:  · Ana suficiente líquido para mantener la orina susie o de color amarillo pálido.  · Vacíe la vejiga con frecuencia. Evite retener la orina ji largos períodos.  · Después de defecar, las mujeres deben limpiarse desde adelante hacia atrás, usando el papel higiénico solo caio vez.  · Vacíe la vejiga después de tener relaciones sexuales.  · [place] los medicamentos solamente betsy se lo haya indicado el médico.  · Si le recetaron antibióticos, asegúrese de terminarlos, incluso si comienza a sentirse mejor.  · Evite la cafeína, el té y el alcohol. Estos productos pueden irritar la vejiga y empeorar la disuria. En los hombres, el alcohol puede irritar la próstata.  · Concurra a todas las visitas de control betsy se lo haya indicado el médico. [place] es importante.  · Si le realizaron pruebas para detectar la causa de la disuria, es huitron responsabilidad retirar los resultados. Consulte en el laboratorio o en el departamento en el que fue realizado el estudio cuándo y cómo podrá obtener los resultados. Hable con el médico si tiene alguna pregunta sobre los resultados.  SOLICITE ATENCIÓN MÉDICA SI:  · Siente dolor en la espalda o a los costados del cuerpo.  · Tiene fiebre.  · Tiene náuseas o vómitos.  · Observa mark en la orina.  · Está orinando con más frecuencia que lo  habitual.  SOLICITE ATENCIÓN MÉDICA DE INMEDIATO SI:  · El dolor es intenso y no se ousmane con los medicamentos.  · No puede retener líquido.  · Usted u otra persona advierten algún cambio en huitron función mental.  · Tiene caio frecuencia cardíaca acelerada en reposo.  · Tiene temblores o escalofríos.  · Se siente muy débil.  Esta información no tiene betsy fin reemplazar el consejo del médico. Asegúrese de hacerle al médico cualquier pregunta que tenga.  Document Released: 01/06/2009 Document Revised: 04/10/2017 Document Reviewed: 08/13/2015  [employer] Interactive Patient Education © 2018 [employer]    Medications and plan of care reviewed with patient and spouse and teaching done on med reactions/side effects(printed in English and Syriac).  Rest, plenty of fluids, comfort measures and follow up with PCP if symptoms persist.  If worse go to urgent care or ER as discussed.